# Patient Record
Sex: FEMALE | Race: WHITE | Employment: FULL TIME
[De-identification: names, ages, dates, MRNs, and addresses within clinical notes are randomized per-mention and may not be internally consistent; named-entity substitution may affect disease eponyms.]

---

## 2021-09-03 ENCOUNTER — HOSPITAL ENCOUNTER (OUTPATIENT)
Dept: PHYSICAL THERAPY | Facility: CLINIC | Age: 36
Setting detail: THERAPIES SERIES
Discharge: HOME | End: 2021-09-03
Payer: COMMERCIAL

## 2021-09-03 PROCEDURE — 97161 PT EVAL LOW COMPLEX 20 MIN: CPT

## 2021-09-03 PROCEDURE — 97110 THERAPEUTIC EXERCISES: CPT

## 2021-09-03 PROCEDURE — 97140 MANUAL THERAPY 1/> REGIONS: CPT

## 2021-09-03 NOTE — CONSULTS
[] [address]   Outpatient Rehabilitation &  Therapy  [address] [place] [address]  P: [phone]  F: [phone] [] [address]  P: [phone]  F: [phone] [] [address]  [facility]   Mariana Cleaves: [phone]  F: [phone]         Physical Therapy Running Evaluation    Date:  9/3/2021  Patient: Ela Wallace   : 1985  MRN: [id number]  Physician: Dr. Karla Fang: [facility], no copay, de 700/met, 10%)  Medical Diagnosis:  R SI dysfunction  Rehab Codes: M53.3  Onset date: 20    Next 's appt.: none    Subjective:   CC: R buttock pain  HPI: 2020: fell onto L side. No medical treatment. She doesn't remember for sure  and pain increased with running afterwards. It has never been horrible but has never been the same. She saw Dr. Hoskins Client a few months later. No testing. She went to PT ~10 visits at [facility].  + LLD and had hip mobilizations, list of 10 exercises:  Bridges w/ ball between knees, banded bridges, straight leg hip ext, 3 way hip, . She describes herself as more of a casual run. 9-10 min/mile     PMHx: [x] Unremarkable [] Diabetes [] HTN  [] Pacemaker   [] MI/Heart Problems [] Cancer [] Arthritis  [] Other:              [] Refer to full medical chart  In EPIC     Tests: [] X-Ray: [] MRI:  [x] none:     Medications: [x] Refer to full medical record [] None [] Other:  Allergies:      [x] Refer to full medical record [] None [] Other:    Working:  [x] Normal Duty  [] Light Duty  [] Off D/T Condition  [] Retired    [] Not Employed    []  Disability  [] Other:           Return to work:     Job/ADL Description: sit       Pain:  [x] Yes  [] No   Location:   Pain Rating: (0-10 scale)   1.  R upper buttock  0-3/10      Pain altered Tx:  [] Yes  [x] No  Action:  Symptoms:  [x] Improving [] Worsening [] Same  Better:  [] Meds    [] Ice pack    [] Sit    [x] Not running  []Stand    [] Walk    [] Stretching   [] Other:  Worse: [x] Run    [] Easy    [] Speed work    []Stand    [x] Walk    [] Stairs    [] Sit    [] Other:  Sleep: [x] OK    [] Disturbed    Objective:    ROM  ° A/P wnl on L with all and all except ext on R STRENGTH TESTS (+/-) Left Right Not Tested    Left Right Left Right Ant. Drawer   []   Hip Flex     Post. Drawer   []   Ext 30 10  4- Lachmans   []   ER    4- Valgus Stress   []   IR     Varus Stress   []   ABD    4- Alisons   []   ADD     Pat-Fem Grind   []   Knee Flex     FADIRs  - []   Ext     Hip Scouring   []   Ankle DF     ABILIOs  - []   PF     Piriformis  - []   INV     Marcias   []   EVER     Miguel Ángel    + []   GTE     Levon's   []       OBSERVATION No Deficit Deficit Not Tested Comments   Posture       Forward Head [] [] []    Rounded Shoulders [] [] []    Kyphosis [] [] []    Lordosis [] [] []    Scoliosis [] [] []    Iliac Crest [] [] []    PSIS [] [] []    ASIS [] [] []    Genu Valgus [] [] []    Genu Varus [] [] []    Genu Recurvatum [] [] []    Pronation [] [] []    Supination [] [] []    Leg Length Discrp [] [] []    Slumped Sitting [] [] []    Palpation [] [x] [] + exquisite tenderness on R hip flexor (proximally on iliacus and psoas and distal insertion), piriformis, glut med   Sensation [] [] []    Edema [] [] []    Neurological [] [] []    Patellar Mobility [] [] []    Patellar Orientation [] [] []    Gait [] [] [] Analysis: deferred as she did not have running apparel          Functional Test: UWRI Score: 38% functionally impaired     Comments:  Assessment:Patient would benefit from skilled physical therapy services in order to: improve hip ext, ER, and abd MMT, increase hip ext ROM so that she can return to running    Problems:    [x] ? Pain: in  R buttock    [x] ? ROM: with r hip extension   [x] ? Strength: With R hip ext, ER, and abd   [x] ? Function: Not able to run as she wishes   [] ?  Balance  [] Increased edema:  [x] Postural Deviations as she frequently assumes non neutral hip and pelvic postures in sititing and standing  [x] Gait Deviations TBD  []  UWRI score is 20/36  [] Other:      STG: (to be met in 5 treatments)  1. ? Pain:<2/10  2. ? ROM: full hip ext from visit to visit  3. ? Strength: at least 5/5 on subesquent visits  4. ? Function:able to run 1 mile 3x/wk  5. UWRI score to >28/36  6. Independent with Home Exercise Programs    LTG: (to be met in 10 treatments)  1. No pain with running and walking  2. MMT with 5/5 with hip abd, ER, and ext  3. No limitations with running   4. Able to run without feeling of compensation  5. UWRI >33/36   Patient goals:    1. No pain with running and with start/stop    2. No pain with walking    Rehab Potential:  [x] Good  [] Fair  [] Poor   Suggested Professional Referral:  [x] No  [] Yes:  Barriers to Goal Achievement[de-identified]  [x] No  [] Yes:  Domestic Concerns:  [x] No  [] Yes:    Pt. Education:  [x] Plans/Goals, Risks/Benefits discussed  [x] Home exercise program    Method of Education: [] Verbal  [] Demo  [] Written  Comprehension of Education:  [] Verbalizes understanding. [] Demonstrates understanding. [] Needs Review. [] Demonstrates/verbalizes understanding of HEP/Ed previously given.     Treatment Plan:  [x] Therapeutic Exercise    [x] Therapeutic Activity  [x] Manual Therapy   [x] Alter G treadmill  [x] Phys perf test     [x] Vasocompression/Game Ready   [x] Neuromuscular Re-education [x] Instruction in HEP     [x] Aquatic Therapy                           Frequency:  1x/week for 10 visits    Todays Treatment:  Modalities: none  Precautions: standard  Exercises:  Exercise Reps/ Time Weight/ Level Issued for HEP  Comments   Supine        Miguel Ángel stretch 3x30\"  x x    1 legged bridges 2x10  x x    Sidelying        Clams 90 deg 2x10 blue x x    Julia hip abd 2x10  x x    Gym        Tippy bird     1/2 legged   Monster walks 2x15  4x15' x Issued blue

## 2021-09-10 ENCOUNTER — HOSPITAL ENCOUNTER (OUTPATIENT)
Dept: PHYSICAL THERAPY | Facility: CLINIC | Age: 36
Setting detail: THERAPIES SERIES
Discharge: HOME | End: 2021-09-10
Payer: COMMERCIAL

## 2021-09-10 PROCEDURE — 97140 MANUAL THERAPY 1/> REGIONS: CPT

## 2021-09-10 PROCEDURE — 97110 THERAPEUTIC EXERCISES: CPT

## 2021-09-10 NOTE — FLOWSHEET NOTE
[x] [facility]    [address], [place]    Phone: [phone]    Fax:  [phone]       Physical Therapy Daily Treatment Note    Date:  9/10/2021  Patient Name:  Paula Part    :  1985  MRN: [id number]  Physician: Dr. Donavon Vargas: Karlis Patches ([address], no copay, de 700/met, 10%)  Medical Diagnosis:   R SI dysfunction         Rehab Codes: M53.3  Onset date: 20                                      Next 's appt.: none  Visit# / total visits: 2/10   Cancels/No Shows: 0/0    Subjective:    Pain:  [x] Yes  [] No Location: R buttock Pain Rating: (0-10 scale) /10  Pain altered Tx:  [x] No  [] Yes  Action:  Comments:the pain is slightly worse. More discomfort than pain. + walking     Objective:  Modalities: none  Precautions: standard  Exercises:  Exercise Reps/ Time Weight/ Level Issued for HEP   Comments   Supine             Miguel Ángel stretch 3x30\"   x      1 legged bridges 2x10   x      Sidelying            Clams 90 deg 2x10 blue x      Julia hip abd 2x10   x      Gym             Tippy bird         1/2 legged   Monster walks 4x20'  grey x x Issued grey   Lunge walks 4x20'     x     Heel taps 15 6\"  x    Step downs 15x ea 6\" 9/10 x Posterior and lateral   Other:     Specific Instructions for next treatment:  1. Running mechanics analysis    Treatment Charges: Mins Units   []  Phys perf test     [x]  Ther Exercise 30 2   [x]  Manual Therapy 10 1   []  Ther Activities     []  NMR     []  Vasocompression     []  Other     Total Treatment time 40 3       Assessment: [x] Progressing toward goals. Issued script for new shoes from [employer]  Neutral 8mm med stack height as she has non runnning [employer] shoes. 5/5 with R hip abd and ER. Hip ext was initially limited but WNL after manual.  Advanced her strengthening ex. Modified her posture as she stood with poor abdominal stabilization.   Decreased tenderness on R hip flexor and buttock    [] No change. [] Other:    Goals:  STG: (to be met in 5 treatments)  1. ? Pain:<2/10  2. ? ROM: full hip ext from visit to visit  3. ? Strength: at least 5/5 on subesquent visits  4. ? Function:able to run 1 mile 3x/wk  5. UWRI score to >28/36  6. Independent with Home Exercise Programs     LTG: (to be met in 10 treatments)  1. No pain with running and walking  2. MMT with 5/5 with hip abd, ER, and ext  3. No limitations with running   4. Able to run without feeling of compensation  5. UWRI >33/36   Patient goals:    1. No pain with running and with start/stop    2. No pain with walking    Pt. Education:  [x] Yes  [] No  [x] Reviewed Prior HEP/Ed  Method of Education: [] Verbal  [x] Demo  [x] Written   Comprehension of Education:  [] Verbalizes understanding. [] Demonstrates understanding. [x] Needs review. [] Demonstrates/verbalizes HEP/Ed previously given. Plan: [x] Continue per plan of care.  1x/wk   [] Other:     Time In:  3469         Time Out: 1500    Electronically signed by:  Fariba Dean, PT

## 2021-09-17 ENCOUNTER — HOSPITAL ENCOUNTER (OUTPATIENT)
Dept: PHYSICAL THERAPY | Facility: CLINIC | Age: 36
Setting detail: THERAPIES SERIES
Discharge: HOME | End: 2021-09-17
Payer: COMMERCIAL

## 2021-09-17 PROCEDURE — 97110 THERAPEUTIC EXERCISES: CPT

## 2021-09-17 PROCEDURE — 97112 NEUROMUSCULAR REEDUCATION: CPT

## 2021-09-17 PROCEDURE — 97750 PHYSICAL PERFORMANCE TEST: CPT

## 2021-09-17 PROCEDURE — 97140 MANUAL THERAPY 1/> REGIONS: CPT

## 2021-09-17 NOTE — FLOWSHEET NOTE
[x] [facility]    [address] [place] [address]    Phone: [phone]    Fax:  [phone]       Physical Therapy Daily Treatment Note    Date:  2021  Patient Name:  Anjali Benitez    :  1985  MRN: [id number]  Physician: Dr. Juárez Bulls: Memo Dos Santos ([employer] [employer], no copay, de 700/met, 10%)  Medical Diagnosis:   R SI dysfunction         Rehab Codes: M53.3  Onset date: 20                                      Next 's appt.: none  Visit# / total visits: 3/10   Cancels/No Shows: 0/0    Subjective:    Pain:  [x] Yes  [] No Location: R buttock Pain Rating: (0-10 scale) /10  Pain altered Tx:  [x] No  [] Yes  Action:  Comments:when she left here, she was sore in a good way. No pain this week. Objective:  Modalities: none  Precautions: standard  Exercises:  Exercise Reps/ Time Weight/ Level Issued for HEP   Comments   Supine             Miguel Ángel stretch 3x30\"   x      1 legged bridges 2x10   x      Sidelying            Clams 90 deg 2x10 blue x      Julia hip abd 2x10   x      Gym             Tippy bird 15   9/17 x 1/2 legged   Monster walks 4x20'  grey x  Issued grey   Lunge walks 4x20'          Heel taps 15 6\"      Step downs 15x ea 6\" 10  Posterior and lateral   Other:  Manual  1  DI to R proximal hip flexors and distal    Video Run Analysis   Pace:   9 min/mile   Shoes: cat hopper   Lorraine:160  spm    Frontal plane deviations:    Dynamic genu valgus slightly on L       Sagittal plane deviations:     Slight increase knee extension at initial contact    Slight elevated foot ground angle at initial contact       Other:      Recommendations:     Increase lorraine to 170 spm    slighltly more knee flexion during swing         Specific Instructions for next treatment:  1. She is clear to run 2-3 runs at 3' walk/2' run--2' walk/3' run--1' walk/4 min run x 6 cycles every other day  2.   She is to purchase a lax ball

## 2021-10-04 ENCOUNTER — HOSPITAL ENCOUNTER (OUTPATIENT)
Dept: PHYSICAL THERAPY | Facility: CLINIC | Age: 36
Setting detail: THERAPIES SERIES
Discharge: HOME | End: 2021-10-04
Payer: COMMERCIAL

## 2021-10-08 ENCOUNTER — APPOINTMENT (OUTPATIENT)
Dept: PHYSICAL THERAPY | Facility: CLINIC | Age: 36
End: 2021-10-08
Payer: COMMERCIAL

## 2021-10-13 ENCOUNTER — HOSPITAL ENCOUNTER (OUTPATIENT)
Dept: PHYSICAL THERAPY | Facility: CLINIC | Age: 36
Setting detail: THERAPIES SERIES
Discharge: HOME | End: 2021-10-13
Payer: COMMERCIAL

## 2021-10-13 PROCEDURE — 97110 THERAPEUTIC EXERCISES: CPT

## 2021-10-13 NOTE — FLOWSHEET NOTE
[x] [facility]    [address] [place] [address]    Phone: [phone]    Fax:  [phone]       Physical Therapy Daily Treatment Note    Date:  10/13/2021  Patient Name:  Ivory Whitt    :  1985  MRN: [id number]  Physician: Dr. Perla Penaloza: Dennie Doctor ([facility], no copay, de 700/met, 10%)  Medical Diagnosis:   R SI dysfunction         Rehab Codes: M53.3  Onset date: 20                                      Next Dr's appt.: none  Visit# / total visits: 4/10   Cancels/No Shows: 1/0    Subjective:    Pain:  [x] Yes  [] No Location: R buttock Pain Rating: (0-10 scale) 2/10 today and is the worst  Pain altered Tx:  [x] No  [] Yes  Action:  Comments: she fell while trail running, so she is not sure if it is sore from new ex or from the fall. Has run up to 30 min. She wishes to progress her HEP. No need to review run mechanics.      Objective:  Modalities: none  Precautions: standard  Exercises:  Exercise Reps/ Time Weight/ Level Issued for HEP   Comments   Supine             1 legged bridges 2x10 Red 10/13 x     Sidelying            Side plank w/ SKTC 2x10 blue 10/13 x     Gym             Cups 3x   10/13 x 10-11-12-1-2   Monster walks 4x20'  grey x  Issued grey   Split squat hops 2x15  10/13 x    Lunge walks 4x20'          Heel taps 15 6\"      Step downs 15x ea 6\" 10/13  Lateral, 85 bpm   Other:    Video Run Analysis   Pace:   9 min/mile   Shoes: elie ruchi   Lorraine:160  spm    Frontal plane deviations:    Dynamic genu valgus slightly on L       Sagittal plane deviations:     Slight increase knee extension at initial contact    Slight elevated foot ground angle at initial contact       Other:      Recommendations:     Increase lorraine to 170 spm    slighltly more knee flexion during swing         Specific Instructions for next treatment:      Treatment Charges: Mins Units   []  Phys perf test     [x]  Ther Exercise 45 3   []  Manual Therapy     []  Ther Activities     []  NMR     []  Vasocompression     []  Other     Total Treatment time 45 3       Assessment: [x] Progressing toward goals. Full AROM olayinka and 5/5 with all hip movements. Leg length is =. Advanced HEP. Pt is pleased with status and progress. She demonstrates no irritability with her exercises and good functional stability    [] No change. [] Other:    Goals:  STG: (to be met in 5 treatments)  1. ? Pain:<2/10 MET   2. ? ROM: full hip ext from visit to visit MET  3. ? Strength: at least 5/5 on subesquent visits <MET  4. ? Function:able to run 1 mile 3x/wk  5. UWRI score to >28/36  6. Independent with Home Exercise Programs     LTG: (to be met in 10 treatments)  1. No pain with running and walking  2. MMT with 5/5 with hip abd, ER, and ext MET  3. No limitations with running   4. Able to run without feeling of compensation  5. UWRI >33/36   Patient goals:    1. No pain with running and with start/stop    2. No pain with walking    Pt. Education:  [x] Yes  [] No  [x] Reviewed Prior HEP/Ed  Method of Education: [] Verbal  [x] Demo  [x] Written   Access Code: [email or web address]  URL: [email or web address]  Date: 10/13/2021  Prepared by: Helene Cunha    Exercises  side plank with hip flexion (march) - 1 x daily - 4 x weekly - 2 sets - 10 reps  Single Leg Bridge on [place] - 1 x daily - 4 x weekly - 2 sets - 10 reps  Cups - 1 x daily - 4 x weekly - 2 sets - 3-5 reps  Split squat hops - 1 x daily - 4 x weekly - 2 sets - 15-20 reps  Lateral Step Down - 1 x daily - 4 x weekly - 2 sets - 10-20 reps      Comprehension of Education:  [] Verbalizes understanding. [] Demonstrates understanding. [x] Needs review. [] Demonstrates/verbalizes HEP/Ed previously given. Plan: [x] Continue per plan of care.  1x/wk she may cancel her last appt if she is back to running and no issues   [] Other:     Time In:  1600 Time Out: 1657    Electronically signed by:  Stanton Jeong, PT